# Patient Record
Sex: MALE | Race: WHITE | NOT HISPANIC OR LATINO | URBAN - METROPOLITAN AREA
[De-identification: names, ages, dates, MRNs, and addresses within clinical notes are randomized per-mention and may not be internally consistent; named-entity substitution may affect disease eponyms.]

---

## 2021-01-17 ENCOUNTER — EMERGENCY (EMERGENCY)
Facility: HOSPITAL | Age: 26
LOS: 1 days | Discharge: HOME | End: 2021-01-17
Payer: COMMERCIAL

## 2021-01-17 VITALS
HEIGHT: 72 IN | RESPIRATION RATE: 18 BRPM | SYSTOLIC BLOOD PRESSURE: 119 MMHG | HEART RATE: 60 BPM | TEMPERATURE: 98 F | OXYGEN SATURATION: 97 % | WEIGHT: 184.97 LBS | DIASTOLIC BLOOD PRESSURE: 71 MMHG

## 2021-01-17 LAB — SARS-COV-2 RNA SPEC QL NAA+PROBE: SIGNIFICANT CHANGE UP

## 2021-01-17 PROCEDURE — 99283 EMERGENCY DEPT VISIT LOW MDM: CPT

## 2021-01-17 PROCEDURE — U0003: CPT

## 2021-01-17 PROCEDURE — U0005: CPT

## 2021-01-20 NOTE — ED PROVIDER NOTE - PATIENT PORTAL LINK FT
You can access the FollowMyHealth Patient Portal offered by [facility] by registering at the following website: [email or web address]. By joining [employer]’s FollowMyHealth portal, you will also be able to view your health information using other applications (apps) compatible with our system.

## 2021-08-23 ENCOUNTER — EMERGENCY (EMERGENCY)
Facility: HOSPITAL | Age: 26
LOS: 1 days | Discharge: HOME | End: 2021-08-23
Attending: STUDENT IN AN ORGANIZED HEALTH CARE EDUCATION/TRAINING PROGRAM
Payer: COMMERCIAL

## 2021-08-23 VITALS
TEMPERATURE: 98 F | HEIGHT: 72 IN | OXYGEN SATURATION: 96 % | SYSTOLIC BLOOD PRESSURE: 148 MMHG | HEART RATE: 84 BPM | DIASTOLIC BLOOD PRESSURE: 81 MMHG | WEIGHT: 164.91 LBS | RESPIRATION RATE: 18 BRPM

## 2021-08-23 LAB
RAPID RVP RESULT: SIGNIFICANT CHANGE UP
SARS-COV-2 RNA SPEC QL NAA+PROBE: SIGNIFICANT CHANGE UP

## 2021-08-23 PROCEDURE — 99283 EMERGENCY DEPT VISIT LOW MDM: CPT | Mod: 25

## 2021-08-23 PROCEDURE — 0225U NFCT DS DNA&RNA 21 SARSCOV2: CPT

## 2021-08-23 PROCEDURE — 99284 EMERGENCY DEPT VISIT MOD MDM: CPT

## 2021-08-23 PROCEDURE — 93010 ELECTROCARDIOGRAM REPORT: CPT

## 2021-08-23 PROCEDURE — 93005 ELECTROCARDIOGRAM TRACING: CPT

## 2021-08-23 NOTE — ED PROVIDER NOTE - PATIENT PORTAL LINK FT
You can access the FollowMyHealth Patient Portal offered by [facility] by registering at the following website: [email or web address]. By joining [employer]’s FollowMyHealth portal, you will also be able to view your health information using other applications (apps) compatible with our system.

## 2021-08-23 NOTE — ED PROVIDER NOTE - NS ED ROS FT
+ fever no chills, No photophobia/eye pain/changes in vision, No ear pain/sore throat/dysphagia, No chest pain/palpitations, no SOB/cough/wheeze/stridor, No abdominal pain, No N/V +diarrhea, no dysuria/frequency/discharge, No neck/back pain, no rash, no changes in neurological status/function.

## 2021-08-23 NOTE — ED PROVIDER NOTE - CLINICAL SUMMARY MEDICAL DECISION MAKING FREE TEXT BOX
RVP sent. Pt likely with viral syndrome.  lungs clear.  Pt reassured and instructed to isolate until he knows his results. counseled on the r/b/a of vaccination.

## 2021-08-23 NOTE — ED PROVIDER NOTE - OBJECTIVE STATEMENT
Pt is a 25 yo M co headache, fever, diarrhea, and chest tightness. PT states that symptoms started a few days ago and measured a temp of 102 yesterday which responded to tylenol and a temp of 99.9 this morning.  PT states that the chest tightness was worse this morning. no n/v. no cough. no other complaints.
LILIYA Hunt

## 2021-08-23 NOTE — ED ADULT TRIAGE NOTE - CHIEF COMPLAINT QUOTE
Pt requesting covid, pt c/o headache and chest pain since Friday, pt unvaccinated, denies sick contacts

## 2021-08-24 ENCOUNTER — EMERGENCY (EMERGENCY)
Facility: HOSPITAL | Age: 26
LOS: 1 days | Discharge: HOME | End: 2021-08-24
Attending: EMERGENCY MEDICINE
Payer: COMMERCIAL

## 2021-08-24 VITALS
WEIGHT: 165.79 LBS | TEMPERATURE: 99 F | RESPIRATION RATE: 20 BRPM | DIASTOLIC BLOOD PRESSURE: 71 MMHG | OXYGEN SATURATION: 96 % | SYSTOLIC BLOOD PRESSURE: 108 MMHG | HEART RATE: 101 BPM | HEIGHT: 72 IN

## 2021-08-24 VITALS — DIASTOLIC BLOOD PRESSURE: 87 MMHG | TEMPERATURE: 99 F | HEART RATE: 81 BPM | SYSTOLIC BLOOD PRESSURE: 117 MMHG

## 2021-08-24 LAB
ALBUMIN SERPL ELPH-MCNC: 4.4 G/DL — SIGNIFICANT CHANGE UP (ref 3.3–5.2)
ALP SERPL-CCNC: 41 U/L — SIGNIFICANT CHANGE UP (ref 40–120)
ALT FLD-CCNC: 19 U/L — SIGNIFICANT CHANGE UP
ANION GAP SERPL CALC-SCNC: 13 MMOL/L — SIGNIFICANT CHANGE UP (ref 5–17)
AST SERPL-CCNC: 16 U/L — SIGNIFICANT CHANGE UP
BASOPHILS # BLD AUTO: 0 K/UL — SIGNIFICANT CHANGE UP (ref 0–0.2)
BASOPHILS NFR BLD AUTO: 0 % — SIGNIFICANT CHANGE UP (ref 0–2)
BILIRUB SERPL-MCNC: 0.4 MG/DL — SIGNIFICANT CHANGE UP (ref 0.4–2)
BUN SERPL-MCNC: 15.1 MG/DL — SIGNIFICANT CHANGE UP (ref 8–20)
CALCIUM SERPL-MCNC: 9.1 MG/DL — SIGNIFICANT CHANGE UP (ref 8.6–10.2)
CHLORIDE SERPL-SCNC: 99 MMOL/L — SIGNIFICANT CHANGE UP (ref 98–107)
CO2 SERPL-SCNC: 24 MMOL/L — SIGNIFICANT CHANGE UP (ref 22–29)
CREAT SERPL-MCNC: 0.82 MG/DL — SIGNIFICANT CHANGE UP (ref 0.5–1.3)
EOSINOPHIL # BLD AUTO: 0.11 K/UL — SIGNIFICANT CHANGE UP (ref 0–0.5)
EOSINOPHIL NFR BLD AUTO: 1.7 % — SIGNIFICANT CHANGE UP (ref 0–6)
GLUCOSE SERPL-MCNC: 111 MG/DL — HIGH (ref 70–99)
HCT VFR BLD CALC: 48.8 % — SIGNIFICANT CHANGE UP (ref 39–50)
HGB BLD-MCNC: 16.6 G/DL — SIGNIFICANT CHANGE UP (ref 13–17)
LIDOCAIN IGE QN: 11 U/L — LOW (ref 22–51)
LYMPHOCYTES # BLD AUTO: 0.4 K/UL — LOW (ref 1–3.3)
LYMPHOCYTES # BLD AUTO: 6.1 % — LOW (ref 13–44)
MCHC RBC-ENTMCNC: 30.4 PG — SIGNIFICANT CHANGE UP (ref 27–34)
MCHC RBC-ENTMCNC: 34 GM/DL — SIGNIFICANT CHANGE UP (ref 32–36)
MCV RBC AUTO: 89.4 FL — SIGNIFICANT CHANGE UP (ref 80–100)
MONOCYTES # BLD AUTO: 0.23 K/UL — SIGNIFICANT CHANGE UP (ref 0–0.9)
MONOCYTES NFR BLD AUTO: 3.5 % — SIGNIFICANT CHANGE UP (ref 2–14)
NEUTROPHILS # BLD AUTO: 5.74 K/UL — SIGNIFICANT CHANGE UP (ref 1.8–7.4)
NEUTROPHILS NFR BLD AUTO: 82.5 % — HIGH (ref 43–77)
NEUTS BAND # BLD: 4.4 % — SIGNIFICANT CHANGE UP (ref 0–8)
PLAT MORPH BLD: NORMAL — SIGNIFICANT CHANGE UP
PLATELET # BLD AUTO: 148 K/UL — LOW (ref 150–400)
POTASSIUM SERPL-MCNC: 4.2 MMOL/L — SIGNIFICANT CHANGE UP (ref 3.5–5.3)
POTASSIUM SERPL-SCNC: 4.2 MMOL/L — SIGNIFICANT CHANGE UP (ref 3.5–5.3)
PROT SERPL-MCNC: 7.5 G/DL — SIGNIFICANT CHANGE UP (ref 6.6–8.7)
RBC # BLD: 5.46 M/UL — SIGNIFICANT CHANGE UP (ref 4.2–5.8)
RBC # FLD: 11.5 % — SIGNIFICANT CHANGE UP (ref 10.3–14.5)
RBC BLD AUTO: NORMAL — SIGNIFICANT CHANGE UP
SODIUM SERPL-SCNC: 136 MMOL/L — SIGNIFICANT CHANGE UP (ref 135–145)
VARIANT LYMPHS # BLD: 1.8 % — SIGNIFICANT CHANGE UP (ref 0–6)
WBC # BLD: 6.61 K/UL — SIGNIFICANT CHANGE UP (ref 3.8–10.5)
WBC # FLD AUTO: 6.61 K/UL — SIGNIFICANT CHANGE UP (ref 3.8–10.5)

## 2021-08-24 PROCEDURE — 99285 EMERGENCY DEPT VISIT HI MDM: CPT

## 2021-08-24 PROCEDURE — 96374 THER/PROPH/DIAG INJ IV PUSH: CPT | Mod: XU

## 2021-08-24 PROCEDURE — 85025 COMPLETE CBC W/AUTO DIFF WBC: CPT

## 2021-08-24 PROCEDURE — 99284 EMERGENCY DEPT VISIT MOD MDM: CPT | Mod: 25

## 2021-08-24 PROCEDURE — G1004: CPT

## 2021-08-24 PROCEDURE — 83690 ASSAY OF LIPASE: CPT

## 2021-08-24 PROCEDURE — 36415 COLL VENOUS BLD VENIPUNCTURE: CPT

## 2021-08-24 PROCEDURE — 74177 CT ABD & PELVIS W/CONTRAST: CPT | Mod: 26,ME

## 2021-08-24 PROCEDURE — 74177 CT ABD & PELVIS W/CONTRAST: CPT | Mod: ME

## 2021-08-24 PROCEDURE — 80053 COMPREHEN METABOLIC PANEL: CPT

## 2021-08-24 PROCEDURE — 96375 TX/PRO/DX INJ NEW DRUG ADDON: CPT

## 2021-08-24 RX ORDER — ONDANSETRON 8 MG/1
4 TABLET, FILM COATED ORAL ONCE
Refills: 0 | Status: COMPLETED | OUTPATIENT
Start: 2021-08-24 | End: 2021-08-24

## 2021-08-24 RX ORDER — SODIUM CHLORIDE 9 MG/ML
1000 INJECTION INTRAMUSCULAR; INTRAVENOUS; SUBCUTANEOUS ONCE
Refills: 0 | Status: COMPLETED | OUTPATIENT
Start: 2021-08-24 | End: 2021-08-24

## 2021-08-24 RX ORDER — ACETAMINOPHEN 500 MG
1000 TABLET ORAL ONCE
Refills: 0 | Status: COMPLETED | OUTPATIENT
Start: 2021-08-24 | End: 2021-08-24

## 2021-08-24 RX ADMIN — ONDANSETRON 4 MILLIGRAM(S): 8 TABLET, FILM COATED ORAL at 09:57

## 2021-08-24 RX ADMIN — SODIUM CHLORIDE 1000 MILLILITER(S): 9 INJECTION INTRAMUSCULAR; INTRAVENOUS; SUBCUTANEOUS at 10:24

## 2021-08-24 RX ADMIN — Medication 400 MILLIGRAM(S): at 09:57

## 2021-08-24 RX ADMIN — Medication 1000 MILLIGRAM(S): at 09:24

## 2021-08-24 RX ADMIN — SODIUM CHLORIDE 1000 MILLILITER(S): 9 INJECTION INTRAMUSCULAR; INTRAVENOUS; SUBCUTANEOUS at 10:00

## 2021-08-24 NOTE — ED PROVIDER NOTE - NS ED MD DISPO DISCHARGE CCDA
Stop the Effexor completely and stick with the Pristiq. If this does not work then we will have to go back to the Effexor by itself. Can also call in a little prednisone to help with the wheezing for now.   Prednisone 10 mg #7    1 daily Patient/Caregiver provided printed discharge information.

## 2021-08-24 NOTE — ED PROVIDER NOTE - PATIENT PORTAL LINK FT
You can access the FollowMyHealth Patient Portal offered by [facility] by registering at the following website: [email or web address]. By joining [employer]’s FollowMyHealth portal, you will also be able to view your health information using other applications (apps) compatible with our system.

## 2021-08-24 NOTE — ED ADULT NURSE NOTE - OBJECTIVE STATEMENT
pt with all over abd pain  since Friday  has been taking pepto bismol, and tylenols  last being saturday   last po salad for dinner.    NO COVID VACCINE pt here with mother

## 2021-08-24 NOTE — ED ADULT TRIAGE NOTE - CHIEF COMPLAINT QUOTE
"I'm having abdominal pain in the center of my stomach with one episode of vomiting, pain started Friday". pt also co diarrhea and fevers.

## 2023-09-04 ENCOUNTER — EMERGENCY (EMERGENCY)
Facility: HOSPITAL | Age: 28
LOS: 1 days | Discharge: HOME | End: 2023-09-04
Attending: EMERGENCY MEDICINE
Payer: COMMERCIAL

## 2023-09-04 VITALS
TEMPERATURE: 98 F | RESPIRATION RATE: 18 BRPM | SYSTOLIC BLOOD PRESSURE: 125 MMHG | WEIGHT: 160.06 LBS | OXYGEN SATURATION: 99 % | HEART RATE: 84 BPM | DIASTOLIC BLOOD PRESSURE: 76 MMHG | HEIGHT: 71 IN

## 2023-09-04 PROCEDURE — 99283 EMERGENCY DEPT VISIT LOW MDM: CPT

## 2023-09-04 PROCEDURE — 12001 RPR S/N/AX/GEN/TRNK 2.5CM/<: CPT

## 2023-09-04 PROCEDURE — 99284 EMERGENCY DEPT VISIT MOD MDM: CPT | Mod: 25

## 2023-09-04 RX ORDER — CEPHALEXIN 500 MG
500 CAPSULE ORAL ONCE
Refills: 0 | Status: COMPLETED | OUTPATIENT
Start: 2023-09-04 | End: 2023-09-04

## 2023-09-04 RX ORDER — CEPHALEXIN 500 MG
1 CAPSULE ORAL
Qty: 28 | Refills: 0
Start: 2023-09-04 | End: 2023-09-10

## 2023-09-04 RX ADMIN — Medication 500 MILLIGRAM(S): at 21:57

## 2023-09-04 NOTE — ED PROVIDER NOTE - PHYSICAL EXAMINATION
Gen: Nontoxic, well appearing, in NAD.  Skin: Warm and dry as visualized. Approx. 2cm horizontal laceration over left iliac crest. No active bleeding.   Head: NC/AT.  Eyes: PERRLA. EOMI.  Neck: Supple, FROM. Trachea midline.   Resp: No distress.  Cardio: Well perfused.  Ext: No deformities. MAEx4. FROM.   Neuro: A&Ox3. Appears nonfocal.   Psych: Normal affect and mood.
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
## 2023-09-04 NOTE — ED PROVIDER NOTE - NS ED ATTENDING STATEMENT MOD
This was a shared visit with the JAI. I reviewed and verified the documentation and independently performed the documented:

## 2023-09-04 NOTE — ED PROVIDER NOTE - ATTENDING APP SHARED VISIT CONTRIBUTION OF CARE
I personally saw the patient with the JAI, and completed the key components of the history and physical exam. I then discussed the management plan with the JAI.
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
## 2023-09-04 NOTE — ED PROVIDER NOTE - NSFOLLOWUPINSTRUCTIONS_ED_ALL_ED_FT
- Prescription sent to pharmacy.  - Ibuprofen 600mg every 6 hours as needed for pain.  - Acetaminophen 650mg every 6 hours as needed for pain.   - Keep dressing clean, dry and in place for 24 hours. Then, may remove and cleanse using soap and water.  - Apply Bacitracin as needed.  - Suture removal 7-10 days. May present to primary care doctor, urgent care, or ED.  - Keep out of sun.  - Please follow up with your primary care physician in 48 hours for wound check.  - Please seek immediate medical attention for any new/worsening, signs/symptoms or concerns including but not limited to severe pain/swelling/surrounding redness, or drainage from wound.    Feel better!      Sutured Wound Care      Sutures are stitches that can be used to close wounds. Taking care of your wound properly can help to prevent pain and infection. It can also help your wound to heal more quickly. Follow instructions from your health care provider about how to care for your sutured wound.      Supplies needed:    •Soap and water.      •A clean bandage (dressing), if needed.      •Antibiotic ointment.      •A clean towel.        How to care for your sutured wound     •Keep the wound completely dry for the first 24 hours, or for as long as directed by your health care provider. After 24–48 hours, you may shower or bathe as directed by your health care provider. Do not soak or submerge the wound in water until the sutures have been removed.    •After the first 24 hours, clean the wound once a day, or as often as directed by your health care provider, using the following steps:  •Wash the wound with soap and water.      •Rinse the wound with water to remove all soap.      •Pat the wound dry with a clean towel. Do not rub the wound.        •After cleaning the wound, apply a thin layer of antibiotic ointment as directed by your health care provider. This will prevent infection and keep the dressing from sticking to the wound.    •Follow instructions from your health care provider about how to change your dressing:  •Wash your hands with soap and water. If soap and water are not available, use hand .      •Change your dressing at least once a day, or as often as told by your health care provider. If your dressing gets wet or dirty, change it.      •Leave sutures and other skin closures, such as adhesive tape or skin glue, in place. These skin closures may need to stay in place for 2 weeks or longer. If adhesive strip edges start to loosen and curl up, you may trim the loose edges. Do not remove adhesive strips completely unless your health care provider tells you to do that.      •Check your wound every day for signs of infection. Watch for:  •Redness, swelling, or pain.      •Fluid or blood.      •Warmth.      •Pus or a bad smell.        •Have the sutures removed as directed by your health care provider.        Follow these instructions at home:    Medicines     •Take or apply over-the-counter and prescription medicines only as told by your health care provider.      •If you were prescribed an antibiotic medicine or ointment, take or apply it as told by your health care provider. Do not stop using the antibiotic even if your condition improves.      General instructions     •To help reduce scarring after your wound heals, cover your wound with clothing or apply sunscreen of at least 30 SPF whenever you are outside.      • Do not scratch or pick at your wound.      •Avoid stretching your wound.      •Raise (elevate) the injured area above the level of your heart while you are sitting or lying down, if possible.      •Drink enough fluids to keep your urine clear or pale yellow.      •Keep all follow-up visits as told by your health care provider. This is important.        Contact a health care provider if:    •You received a tetanus shot and you have swelling, severe pain, redness, or bleeding at the injection site.      •Your wound breaks open.      •You have redness, swelling, or pain around your wound.      •You have fluid or blood coming from your wound.      •Your wound feels warm to the touch.      •You have a fever.      •You notice something coming out of your wound, such as wood or glass.      •You have pain that does not get better with medicine.      •The skin near your wound changes color.      •You need to change your dressing very frequently due to a lot of fluid, blood, or pus draining from the wound.      •You develop a new rash.      •You develop numbness around the wound.        Get help right away if:    •You develop severe swelling around your wound.      •You have pus or a bad smell coming from your wound.      •Your pain suddenly gets worse and is severe.      •You develop painful lumps near your wound or anywhere on your body.      •You have a red streak going away from your wound.    •The wound is on your hand or foot and:  •You cannot properly move a finger or toe.      •Your fingers or toes look pale or bluish.      •You have numbness that is spreading down your hand, foot, fingers, or toes.          Summary    •Sutures are stitches that can be used to close wounds.      •Taking care of your wound properly can help to prevent pain and infection.      •Keep the wound completely dry for the first 24 hours, or for as long as directed by your health care provider. After 24–48 hours, you may shower or bathe as directed by your health care provider.      This information is not intended to replace advice given to you by your health care provider. Make sure you discuss any questions you have with your health care provider.

## 2023-09-04 NOTE — ED PROVIDER NOTE - PATIENT PORTAL LINK FT
You can access the FollowMyHealth Patient Portal offered by [facility] by registering at the following website: [email or web address]. By joining [employer]’s FollowMyHealth portal, you will also be able to view your health information using other applications (apps) compatible with our system.

## 2023-09-05 PROBLEM — Z78.9 OTHER SPECIFIED HEALTH STATUS: Chronic | Status: ACTIVE | Noted: 2021-08-24

## 2023-09-10 ENCOUNTER — EMERGENCY (EMERGENCY)
Facility: HOSPITAL | Age: 28
LOS: 1 days | Discharge: HOME | End: 2023-09-10
Attending: EMERGENCY MEDICINE
Payer: COMMERCIAL

## 2023-09-10 VITALS
SYSTOLIC BLOOD PRESSURE: 130 MMHG | TEMPERATURE: 98 F | OXYGEN SATURATION: 97 % | DIASTOLIC BLOOD PRESSURE: 87 MMHG | WEIGHT: 160.06 LBS | HEART RATE: 97 BPM | RESPIRATION RATE: 18 BRPM | HEIGHT: 71 IN

## 2023-09-10 PROCEDURE — 99283 EMERGENCY DEPT VISIT LOW MDM: CPT | Mod: 25

## 2023-09-10 PROCEDURE — 12011 RPR F/E/E/N/L/M 2.5 CM/<: CPT

## 2023-09-10 PROCEDURE — 99282 EMERGENCY DEPT VISIT SF MDM: CPT

## 2023-09-10 NOTE — ED PROVIDER NOTE - PATIENT PORTAL LINK FT
You can access the FollowMyHealth Patient Portal offered by [facility] by registering at the following website: [email or web address]. By joining [employer]’s FollowMyHealth portal, you will also be able to view your health information using other applications (apps) compatible with our system.
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
## 2023-09-10 NOTE — ED PROVIDER NOTE - PHYSICAL EXAMINATION
General: Awake, alert, lying in bed in NAD  HEENT: 2cm vertical laceration to lower left lateral lip at the vermilion border. No obvious injury to mandible or teeth. No scleral icterus or conjunctival injection. EOMI. Moist mucous membranes. Oropharynx clear. PERRL  Neck:. Soft and supple.  Cardiac: RRR, Peripheral pulses 2+ and symmetric.  Resp: Lungs CTAB. No accessory muscle use  Abd: Soft, non-tender, non-distended. No guarding, rebound, or rigidity.  Back: Spine midline and non-tender.   Skin: lip laceration  Neuro: AO x 4. Moves all extremities symmetrically. Motor strength and sensation grossly intact.  Psych: Appropriate mood and affect General: Awake, alert, lying in bed in NAD  HEENT: 2cm vertical laceration to lower left lateral lip at the vermilion border. 3cm laceration to the interior aspect of lip in same area.  No obvious injury to mandible or teeth. No scleral icterus or conjunctival injection. EOMI. Moist mucous membranes. Oropharynx clear. PERRL  Neck:. Soft and supple.  Cardiac: RRR, Peripheral pulses 2+ and symmetric.  Resp: Lungs CTAB. No accessory muscle use  Abd: Soft, non-tender, non-distended. No guarding, rebound, or rigidity.  Back: Spine midline and non-tender.   Skin: lip laceration  Neuro: AO x 4. Moves all extremities symmetrically. Motor strength and sensation grossly intact.  Psych: Appropriate mood and affect General: Awake, alert, lying in bed in NAD  HEENT: 2cm vertical laceration to lower left lateral lip at the vermilion border. 3cm laceration to the interior aspect of lip in same area. Laceration with mild tenderness. no purulent drainage or surrounding erythema. No obvious injury to mandible or teeth. No scleral icterus or conjunctival injection. EOMI. Moist mucous membranes. Oropharynx clear. PERRL  Neck:. Soft and supple.  Cardiac: RRR, Peripheral pulses 2+ and symmetric.  Resp: Lungs CTAB. No accessory muscle use  Abd: Soft, non-tender, non-distended. No guarding, rebound, or rigidity.  Back: Spine midline and non-tender.   Skin: lip laceration  Neuro: AO x 4. Moves all extremities symmetrically. Motor strength and sensation grossly intact.  Psych: Appropriate mood and affect

## 2023-09-10 NOTE — ED PROVIDER NOTE - NSFOLLOWUPINSTRUCTIONS_ED_ALL_ED_FT
Keep the wound clean and dry. Stitches will need to come out in 7 days.    You are advised to please follow up with your primary care doctor within the next 24 hours and return to the Emergency Department for worsening symptoms or any other concerns.  Your doctor may call [phone] to follow up on the specific results of the tests performed today in the emergency department.     A facial laceration is a cut (laceration) on the face. It is caused by any injury that cuts or tears the skin or tissues on the face. Facial lacerations can bleed and be painful.    You may need medical attention to stop the bleeding, help the wound heal, lower your risk of infection, and prevent scarring. Lacerations usually heal quickly after treatment.    What are the causes?  This condition may be caused by:  A motor vehicle crash.  A sports injury.  A violent attack.  A fall.  What are the signs or symptoms?  Common symptoms of this condition include:  An obvious cut on the face.  Bleeding.  Pain.  Swelling.  Bruising.  A change in the appearance of the face.  How is this diagnosed?  Your health care provider can diagnose a facial laceration by doing a physical exam and asking how the injury happened. Your health care provider may also check for areas of bleeding, tissue damage, nerve injury, and objects (foreign bodies) in your wound.    How is this treated?  Treatment for a facial laceration depends on how severe and deep the wound is. It also depends on the risk for infection. First, your health care provider will clean the wound to prevent infection. Then, your health care provider will decide whether to close the wound. This depends on how deep the laceration is and how long ago your injury happened. If there is an increased risk of infection, the wound will not be closed.  If your wound needs to be closed:  Your health care provider will use stitches (sutures), skin glue (skin adhesive), or skin adhesive strips to repair the laceration.  Your health care provider may numb the area around your wound by injecting a numbing medicine in and around your laceration before doing the sutures.  Torn skin edges or dead skin may be removed.  If sutures are used, the laceration may be closed in layers. Absorbable sutures will be used for deep tissues and muscle. Removable sutures will be used to close the skin.  You may be given:  Pain medicine.  A tetanus shot.  Oral antibiotic medicines.  Antibiotic ointment.  Follow these instructions at home:  Wound care    Follow instructions from your health care provider about how to take care of your wound. Make sure you:  Wash your hands with soap and water for at least 20 seconds before and after you change your bandage (dressing). If soap and water are not available, use hand .  Change your dressing as told by your health care provider.  Leave sutures, skin adhesive, or adhesive strips in place. These skin closures may need to stay in place for 2 weeks or longer. If adhesive strip edges start to loosen and curl up, you may trim the loose edges. Do not remove adhesive strips completely unless your health care provider tells you to do that.  These instructions will vary depending on how the wound was closed.    For sutures:      Keep the wound clean and dry.  If you were given a dressing, change it at least once a day, or as told by your health care provider. Also change the dressing if it gets wet or dirty.  Wash the wound with soap and water two times a day, or as told by your health care provider. Rinse off the soap with water. Pat the wound dry with a clean towel.  After cleaning, apply a thin layer of antibiotic ointment as told by your health care provider. This helps prevent infection and keeps the dressing from sticking to the wound.  You may shower as usual after the first 24 hours. Do not soak the wound until the sutures are removed.  Return to have your sutures removed as told by your health care provider.  Do not wear makeup in the area of the wound until your health care provider has approved.  For skin adhesive:      You may briefly wet your wound in the shower or bath.  Do not soak or scrub the wound.  Do not swim.  Do not sweat heavily until the skin adhesive has fallen off on its own.  After showering or bathing, gently pat the wound dry with a clean towel.  Do not apply liquid medicine, cream medicine, ointment, or makeup to your wound while the skin adhesive is in place. This may loosen the film before your wound is healed.  If you have a dressing over your wound, be careful not to apply tape directly over the skin adhesive. This may pull off the adhesive before the wound is healed.  Do not spend a long time in the sun or use a tanning lamp while the skin adhesive is in place.  The skin adhesive will usually remain in place for 5–10 days and then naturally fall off the skin. Do not pick at the adhesive film.  For skin adhesive strips:      Keep the wound clean and dry.  Do not let the skin adhesive strips get wet.  Bathe carefully to keep the wound and adhesive strips dry. If the wound gets wet, pat it dry with a clean towel right away.  Skin adhesive strips fall off on their own over time. You may trim the strips as the wound heals. Do not remove skin adhesive strips that are still stuck to the wound.  General instructions    Check your wound area every day for signs of infection. Check for:  More redness, swelling, or pain.  Fluid or blood.  Warmth.  Pus or a bad smell.  Take over-the-counter and prescription medicines only as told by your health care provider.  If you were prescribed an antibiotic medicine, take it or apply it as told by your health care provider. Do not stop using the antibiotic even if you start to feel better.  After the laceration has healed:  Know that it can take a year or two for redness or scarring to fade.  Apply sunscreen to the skin of your healed wound to minimize scarring. Ultraviolet (UV) rays can darken scar tissue.  Contact a health care provider if:  You have a fever. A fever is a body temperature that is 100.4°F (38°C) or higher.  You have more redness, swelling, or pain around your wound.  You have fluid or blood coming from your wound.  Your wound feels warm to the touch.  You have pus or a bad smell coming from your wound.  Get help right away if:  You have a red streak going away from your wound.  Summary  You may need treatment for a facial laceration to prevent infection, stop bleeding, help healing, and prevent scarring.  A deep laceration may be closed with stitches (sutures).  Follow your health care provider's wound care instructions carefully.  This information is not intended to replace advice given to you by your health care provider. Make sure you discuss any questions you have with your health care provider.
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
## 2023-09-10 NOTE — ED PROVIDER NOTE - CARE PLAN
Lukasz Anderson)  Medicine  Pulmonary Medicine  [address] [place] [address], [place] [address]  Phone: [phone]  Fax: [phone]  Follow Up Time:    1 Principal Discharge DX:	Lip laceration
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
## 2023-09-13 ENCOUNTER — EMERGENCY (EMERGENCY)
Facility: HOSPITAL | Age: 28
LOS: 1 days | Discharge: LEFT BEFORE DISCHARGE | End: 2023-09-13
Payer: COMMERCIAL

## 2023-09-13 PROCEDURE — L9992: CPT

## 2023-09-14 ENCOUNTER — EMERGENCY (EMERGENCY)
Facility: HOSPITAL | Age: 28
LOS: 1 days | Discharge: HOME | End: 2023-09-14
Attending: EMERGENCY MEDICINE
Payer: COMMERCIAL

## 2023-09-14 VITALS
RESPIRATION RATE: 18 BRPM | HEART RATE: 61 BPM | TEMPERATURE: 98 F | HEIGHT: 71 IN | OXYGEN SATURATION: 99 % | SYSTOLIC BLOOD PRESSURE: 135 MMHG | DIASTOLIC BLOOD PRESSURE: 84 MMHG | WEIGHT: 158.07 LBS

## 2023-09-14 PROCEDURE — G0463: CPT

## 2023-09-14 PROCEDURE — L9995: CPT

## 2023-09-14 NOTE — ED PROVIDER NOTE - ATTENDING APP SHARED VISIT CONTRIBUTION OF CARE
Lore: I performed a face to face bedside interview with patient regarding history of present illness, review of symptoms and past medical history. I completed an independent physical exam.  I have discussed patient's plan of care with advanced care provider.   I agree with note as stated above including HISTORY OF PRESENT ILLNESS, HIV, PAST MEDICAL/SURGICAL/FAMILY/SOCIAL HISTORY, ALLERGIES AND HOME MEDICATIONS, REVIEW OF SYSTEMS, PHYSICAL EXAM, MEDICAL DECISION MAKING and any PROGRESS NOTES during the time I functioned as the attending physician for this patient  unless otherwise noted. My brief assessment is as follows: here for suture removal from hip placed on 9/4, also had lip sutures placed on 9/10. healing, no discharge or other findings. sutures removed by SHYAM Man. wound care instructions. return precautions.

## 2023-09-14 NOTE — ED PROVIDER NOTE - OBJECTIVE STATEMENT
28 yr old M presented to ED for suture removal. Pt denies any redness or pain to his injury site. Pt also denies any fever, chills or issues with the sutures. Pt denies any other issues at this time.

## 2023-09-14 NOTE — ED PROVIDER NOTE - PATIENT PORTAL LINK FT
You can access the FollowMyHealth Patient Portal offered by [facility] by registering at the following website: [email or web address]. By joining [employer]’s FollowMyHealth portal, you will also be able to view your health information using other applications (apps) compatible with our system.

## 2023-09-14 NOTE — ED ADULT TRIAGE NOTE - ESI TRIAGE ACUITY LEVEL, MLM
Subjective:         Patient ID: Jean Pierre Paulson is a 39 y.o. male.    Chief Complaint: Foot Pain      Pain  This is a chronic problem. The current episode started more than 1 year ago. The problem occurs daily. The problem has been waxing and waning. Associated symptoms include arthralgias and neck pain. Pertinent negatives include no abdominal pain, change in bowel habit, chest pain, chills, coughing, diaphoresis, fatigue, fever, rash, sore throat, vertigo or vomiting.   Review of Systems   Constitutional:  Negative for activity change, appetite change, chills, diaphoresis, fatigue, fever and unexpected weight change.   HENT:  Negative for drooling, ear discharge, ear pain, facial swelling, nosebleeds, sore throat, trouble swallowing, voice change and goiter.    Eyes:  Negative for photophobia, pain, discharge, redness and visual disturbance.   Respiratory:  Negative for apnea, cough, choking, chest tightness, shortness of breath, wheezing and stridor.    Cardiovascular:  Negative for chest pain, palpitations and leg swelling.   Gastrointestinal:  Negative for abdominal distention, abdominal pain, change in bowel habit, diarrhea, rectal pain, vomiting, fecal incontinence and change in bowel habit.   Endocrine: Negative for cold intolerance, heat intolerance, polydipsia, polyphagia and polyuria.   Genitourinary:  Negative for bladder incontinence, dysuria, flank pain and frequency.   Musculoskeletal:  Positive for arthralgias, back pain, leg pain and neck pain. Negative for neck stiffness.   Integumentary:  Negative for color change, pallor and rash.   Neurological:  Negative for dizziness, vertigo, seizures, syncope, facial asymmetry, speech difficulty, light-headedness, coordination difficulties, memory loss and coordination difficulties.   Hematological:  Negative for adenopathy. Does not bruise/bleed easily.   Psychiatric/Behavioral:  Negative for agitation, behavioral problems, confusion, decreased concentration,  "dysphoric mood, hallucinations, self-injury and suicidal ideas. The patient is not nervous/anxious and is not hyperactive.          Past Medical History:   Diagnosis Date    Anemia     Chronic osteomyelitis     Diabetes mellitus with neuropathy     Diabetes mellitus, type 2     DM2 (diabetes mellitus, type 2)     HTN (hypertension)     Hypertension     Neuropathy     Obesity     Osteomyelitis 10/2020    Hx of R foot, Tx with IV Abx    Peripheral vascular disease      Past Surgical History:   Procedure Laterality Date    APPLICATION OF SPLIT-THICKNESS SKIN GRAFT (STSG) TO LOWER EXTREMITY Right 3/2/2022    Procedure: APPLICATION, GRAFT, SKIN, SPLIT-THICKNESS, TO LOWER EXTREMITY;  Surgeon: Greg Ramírez MD;  Location: [facility];  Service: General;  Laterality: Right;    CHOLECYSTECTOMY      DEBRIDEMENT OF FOOT Right 10/2020    FOOT SURGERY      Right diabetic foot ulcer Elliott's Grade 3      Bone exposure to R heel, Hx of IV Abx, cultures show multiple bacterial growth, Hx of Osteomyelitis, HBOT     Social History     Socioeconomic History    Marital status: Single    Number of children: 1   Tobacco Use    Smoking status: Former     Packs/day: 0.25     Types: Cigarettes     Start date:      Quit date: 2022     Years since quittin.6    Smokeless tobacco: Never   Substance and Sexual Activity    Alcohol use: Yes     Alcohol/week: 2.0 standard drinks     Types: 1 Glasses of wine, 1 Cans of beer per week     Comment: occasionally    Drug use: Yes     Types: Hydrocodone    Sexual activity: Yes     Partners: Female     Family History   Problem Relation Age of Onset    Hypertension Father     Diabetes Father     Hypertension Maternal Grandmother     Diabetes Maternal Grandmother      Review of patient's allergies indicates:   Allergen Reactions    Tomato Itching        Objective:  Vitals:    22 1020 22 1021   BP: (!) 161/99    Pulse: 89    Weight: (!) 150.6 kg (332 lb)    Height: 6' 1.2" (1.859 m)  "   PainSc:   4   4         Physical Exam  Vitals and nursing note reviewed. Exam conducted with a chaperone present.   Constitutional:       General: He is awake. He is not in acute distress.     Appearance: Normal appearance. He is not ill-appearing, toxic-appearing or diaphoretic.   HENT:      Head: Normocephalic and atraumatic.      Nose: Nose normal.      Mouth/Throat:      Mouth: Mucous membranes are moist.      Pharynx: Oropharynx is clear.   Eyes:      Conjunctiva/sclera: Conjunctivae normal.      Pupils: Pupils are equal, round, and reactive to light.   Cardiovascular:      Rate and Rhythm: Normal rate.   Pulmonary:      Effort: Pulmonary effort is normal. No respiratory distress.   Abdominal:      Palpations: Abdomen is soft.      Tenderness: There is no guarding.   Musculoskeletal:         General: Normal range of motion.      Cervical back: Normal range of motion and neck supple. No rigidity.      Lumbar back: Tenderness present.      Right foot: Tenderness present.      Left foot: Tenderness present.   Skin:     General: Skin is warm and dry.      Coloration: Skin is not jaundiced or pale.   Neurological:      General: No focal deficit present.      Mental Status: He is alert and oriented to person, place, and time. Mental status is at baseline.      Cranial Nerves: No cranial nerve deficit (II-XII).   Psychiatric:         Mood and Affect: Mood normal.         Behavior: Behavior normal. Behavior is cooperative.         Thought Content: Thought content normal.         NM Bone Scan 3 Phase Foot  Narrative: EXAMINATION:  NM BONE SCAN 3 PHASE FOOT    CLINICAL HISTORY:  Post-menopause state, Osteoporosis  Foot swelling, diabetic, osteomyelitis suspected, xray done;suspect chronic osteomyelitis;    COMPARISON:  CT 12 October 2021    FINDINGS:  Patient was injected with 25.0 millicuries of technetium 99M MDP intravenously.  Blood flow, blood pool and delayed images were obtained.    Increased activity diffusely  in the right foot on the blood flow and blood pool imaging.  There is increased osseous uptake on the delayed imaging mostly in the right midfoot but also faintly seen in the right inferior calcaneus and portions of the left midfoot.  The right foot activity mostly correlates with area of severe degenerative changes and malalignment seen on the CT.  Impression: Increased activity diffusely in the right foot soft tissues in the blood flow and blood pool images.  There is increased activity in the midfoot correlating with area of degenerative changes and malalignment.  Faint activity in calcaneus more typical of reactive changes.  Findings do not appear typical for acute osteomyelitis.    Electronically signed by: Sheng Anderson  Date:    02/17/2022  Time:    12:38       Lab Requisition on 09/09/2022   Component Date Value Ref Range Status    Sodium 09/09/2022 142  136 - 145 mmol/L Final    Potassium 09/09/2022 4.5  3.5 - 5.1 mmol/L Final    Chloride 09/09/2022 110 (H)  98 - 107 mmol/L Final    CO2 09/09/2022 23  21 - 32 mmol/L Final    Anion Gap 09/09/2022 14  7 - 16 mmol/L Final    Glucose 09/09/2022 84  74 - 106 mg/dL Final    BUN 09/09/2022 14  7 - 18 mg/dL Final    Creatinine 09/09/2022 0.82  0.70 - 1.30 mg/dL Final    BUN/Creatinine Ratio 09/09/2022 17  6 - 20 Final    Calcium 09/09/2022 7.8 (L)  8.5 - 10.1 mg/dL Final    eGFR 09/09/2022 115  >=60 mL/min/1.73m² Final    CRP 09/09/2022 2.15 (H)  0.00 - 0.80 mg/dL Final   Lab Requisition on 09/02/2022   Component Date Value Ref Range Status    Sodium 09/02/2022 138  136 - 145 mmol/L Final    Potassium 09/02/2022 5.2 (H)  3.5 - 5.1 mmol/L Final    Chloride 09/02/2022 106  98 - 107 mmol/L Final    CO2 09/02/2022 28  21 - 32 mmol/L Final    Anion Gap 09/02/2022 9  7 - 16 mmol/L Final    Glucose 09/02/2022 109 (H)  74 - 106 mg/dL Final    BUN 09/02/2022 19 (H)  7 - 18 mg/dL Final    Creatinine 09/02/2022 1.15  0.70 - 1.30 mg/dL Final    BUN/Creatinine Ratio  09/02/2022 17  6 - 20 Final    Calcium 09/02/2022 8.4 (L)  8.5 - 10.1 mg/dL Final    eGFR 09/02/2022 83  >=60 mL/min/1.73m² Final    CRP 09/02/2022 3.73 (H)  0.00 - 0.80 mg/dL Final    ESR Westergren 09/02/2022 78 (H)  0 - 15 mm/Hr Final    WBC 09/02/2022 6.83  4.50 - 11.00 K/uL Final    RBC 09/02/2022 4.25 (L)  4.60 - 6.20 M/uL Final    Hemoglobin 09/02/2022 11.6 (L)  13.5 - 18.0 g/dL Final    Hematocrit 09/02/2022 35.0 (L)  40.0 - 54.0 % Final    MCV 09/02/2022 82.4  80.0 - 96.0 fL Final    MCH 09/02/2022 27.3  27.0 - 31.0 pg Final    MCHC 09/02/2022 33.1  32.0 - 36.0 g/dL Final    RDW 09/02/2022 14.1  11.5 - 14.5 % Final    Platelet Count 09/02/2022 224  150 - 400 K/uL Final    MPV 09/02/2022 10.8  9.4 - 12.4 fL Final    Neutrophils % 09/02/2022 58.2  53.0 - 65.0 % Final    Lymphocytes % 09/02/2022 28.0  27.0 - 41.0 % Final    Monocytes % 09/02/2022 9.1 (H)  2.0 - 6.0 % Final    Eosinophils % 09/02/2022 3.8  1.0 - 4.0 % Final    Basophils % 09/02/2022 0.6  0.0 - 1.0 % Final    Immature Granulocytes % 09/02/2022 0.3  0.0 - 0.4 % Final    nRBC, Auto 09/02/2022 0.0  <=0.0 % Final    Neutrophils, Abs 09/02/2022 3.98  1.80 - 7.70 K/uL Final    Lymphocytes, Absolute 09/02/2022 1.91  1.00 - 4.80 K/uL Final    Monocytes, Absolute 09/02/2022 0.62  0.00 - 0.80 K/uL Final    Eosinophils, Absolute 09/02/2022 0.26  0.00 - 0.50 K/uL Final    Basophils, Absolute 09/02/2022 0.04  0.00 - 0.20 K/uL Final    Immature Granulocytes, Absolute 09/02/2022 0.02  0.00 - 0.04 K/uL Final    nRBC, Absolute 09/02/2022 0.00  <=0.00 x10e3/uL Final    Diff Type 09/02/2022 Auto   Final   Lab Requisition on 09/01/2022   Component Date Value Ref Range Status    Sodium 09/01/2022 137  136 - 145 mmol/L Final    Potassium 09/01/2022 4.6  3.5 - 5.1 mmol/L Final    Chloride 09/01/2022 105  98 - 107 mmol/L Final    CO2 09/01/2022 28  21 - 32 mmol/L Final    Anion Gap 09/01/2022 9  7 - 16 mmol/L Final    Glucose 09/01/2022 103  74 - 106 mg/dL Final    BUN  09/01/2022 17  7 - 18 mg/dL Final    Creatinine 09/01/2022 1.09  0.70 - 1.30 mg/dL Final    BUN/Creatinine Ratio 09/01/2022 16  6 - 20 Final    Calcium 09/01/2022 9.0  8.5 - 10.1 mg/dL Final    Total Protein 09/01/2022 8.1  6.4 - 8.2 g/dL Final    Albumin 09/01/2022 2.8 (L)  3.5 - 5.0 g/dL Final    Globulin 09/01/2022 5.3 (H)  2.0 - 4.0 g/dL Final    A/G Ratio 09/01/2022 0.5   Final    Bilirubin, Total 09/01/2022 0.4  >0.0 - 1.2 mg/dL Final    Alk Phos 09/01/2022 153 (H)  45 - 115 U/L Final    ALT 09/01/2022 51  16 - 61 U/L Final    AST 09/01/2022 28  15 - 37 U/L Final    eGFR 09/01/2022 89  >=60 mL/min/1.73m² Final    Triglycerides 09/01/2022 109  35 - 150 mg/dL Final    Cholesterol 09/01/2022 120  0 - 200 mg/dL Final    HDL Cholesterol 09/01/2022 45  40 - 60 mg/dL Final    Cholesterol/HDL Ratio (Risk Factor) 09/01/2022 2.7   Final    Non-HDL 09/01/2022 75  mg/dL Final    LDL Calculated 09/01/2022 53  mg/dL Final    LDL/HDL 09/01/2022 1.2   Final    VLDL 09/01/2022 22  mg/dL Final    Free T4 09/01/2022 0.94  0.76 - 1.46 ng/dL Final    TSH 09/01/2022 2.720  0.358 - 3.740 uIU/mL Final    Hemoglobin A1C 09/01/2022 6.8 (H)  4.5 - 6.6 % Final    Estimated Average Glucose 09/01/2022 140  mg/dL Final    WBC 09/01/2022 5.89  4.50 - 11.00 K/uL Final    RBC 09/01/2022 4.63  4.60 - 6.20 M/uL Final    Hemoglobin 09/01/2022 12.4 (L)  13.5 - 18.0 g/dL Final    Hematocrit 09/01/2022 38.7 (L)  40.0 - 54.0 % Final    MCV 09/01/2022 83.6  80.0 - 96.0 fL Final    MCH 09/01/2022 26.8 (L)  27.0 - 31.0 pg Final    MCHC 09/01/2022 32.0  32.0 - 36.0 g/dL Final    RDW 09/01/2022 13.9  11.5 - 14.5 % Final    Platelet Count 09/01/2022 268  150 - 400 K/uL Final    MPV 09/01/2022 10.7  9.4 - 12.4 fL Final    Neutrophils % 09/01/2022 50.0 (L)  53.0 - 65.0 % Final    Lymphocytes % 09/01/2022 36.3  27.0 - 41.0 % Final    Monocytes % 09/01/2022 9.0 (H)  2.0 - 6.0 % Final    Eosinophils % 09/01/2022 3.6  1.0 - 4.0 % Final    Basophils % 09/01/2022  0.8  0.0 - 1.0 % Final    Immature Granulocytes % 09/01/2022 0.3  0.0 - 0.4 % Final    nRBC, Auto 09/01/2022 0.0  <=0.0 % Final    Neutrophils, Abs 09/01/2022 2.94  1.80 - 7.70 K/uL Final    Lymphocytes, Absolute 09/01/2022 2.14  1.00 - 4.80 K/uL Final    Monocytes, Absolute 09/01/2022 0.53  0.00 - 0.80 K/uL Final    Eosinophils, Absolute 09/01/2022 0.21  0.00 - 0.50 K/uL Final    Basophils, Absolute 09/01/2022 0.05  0.00 - 0.20 K/uL Final    Immature Granulocytes, Absolute 09/01/2022 0.02  0.00 - 0.04 K/uL Final    nRBC, Absolute 09/01/2022 0.00  <=0.00 x10e3/uL Final    Diff Type 09/01/2022 Auto   Final   Lab Requisition on 08/26/2022   Component Date Value Ref Range Status    Sodium 08/26/2022 139  136 - 145 mmol/L Final    Potassium 08/26/2022 4.2  3.5 - 5.1 mmol/L Final    Chloride 08/26/2022 106  98 - 107 mmol/L Final    CO2 08/26/2022 28  21 - 32 mmol/L Final    Anion Gap 08/26/2022 9  7 - 16 mmol/L Final    Glucose 08/26/2022 82  74 - 106 mg/dL Final    BUN 08/26/2022 23 (H)  7 - 18 mg/dL Final    Creatinine 08/26/2022 0.97  0.70 - 1.30 mg/dL Final    BUN/Creatinine Ratio 08/26/2022 24 (H)  6 - 20 Final    Calcium 08/26/2022 8.6  8.5 - 10.1 mg/dL Final    eGFR 08/26/2022 102  >=60 mL/min/1.73m² Final    CRP 08/26/2022 1.06 (H)  0.00 - 0.80 mg/dL Final    ESR Westergren 08/26/2022 43 (H)  0 - 15 mm/Hr Final    WBC 08/26/2022 4.74  4.50 - 11.00 K/uL Final    RBC 08/26/2022 4.56 (L)  4.60 - 6.20 M/uL Final    Hemoglobin 08/26/2022 12.5 (L)  13.5 - 18.0 g/dL Final    Hematocrit 08/26/2022 38.4 (L)  40.0 - 54.0 % Final    MCV 08/26/2022 84.2  80.0 - 96.0 fL Final    MCH 08/26/2022 27.4  27.0 - 31.0 pg Final    MCHC 08/26/2022 32.6  32.0 - 36.0 g/dL Final    RDW 08/26/2022 13.8  11.5 - 14.5 % Final    Platelet Count 08/26/2022 253  150 - 400 K/uL Final    MPV 08/26/2022 10.3  9.4 - 12.4 fL Final    Neutrophils % 08/26/2022 38.7 (L)  53.0 - 65.0 % Final    Lymphocytes % 08/26/2022 46.0 (H)  27.0 - 41.0 % Final     Monocytes % 08/26/2022 8.4 (H)  2.0 - 6.0 % Final    Eosinophils % 08/26/2022 6.1 (H)  1.0 - 4.0 % Final    Basophils % 08/26/2022 0.8  0.0 - 1.0 % Final    Immature Granulocytes % 08/26/2022 0.0  0.0 - 0.4 % Final    nRBC, Auto 08/26/2022 0.0  <=0.0 % Final    Neutrophils, Abs 08/26/2022 1.83  1.80 - 7.70 K/uL Final    Lymphocytes, Absolute 08/26/2022 2.18  1.00 - 4.80 K/uL Final    Monocytes, Absolute 08/26/2022 0.40  0.00 - 0.80 K/uL Final    Eosinophils, Absolute 08/26/2022 0.29  0.00 - 0.50 K/uL Final    Basophils, Absolute 08/26/2022 0.04  0.00 - 0.20 K/uL Final    Immature Granulocytes, Absolute 08/26/2022 0.00  0.00 - 0.04 K/uL Final    nRBC, Absolute 08/26/2022 0.00  <=0.00 x10e3/uL Final    Diff Type 08/26/2022 Auto   Final   Lab Requisition on 08/19/2022   Component Date Value Ref Range Status    Sodium 08/19/2022 139  136 - 145 mmol/L Final    Potassium 08/19/2022 4.3  3.5 - 5.1 mmol/L Final    Chloride 08/19/2022 107  98 - 107 mmol/L Final    CO2 08/19/2022 28  21 - 32 mmol/L Final    Anion Gap 08/19/2022 8  7 - 16 mmol/L Final    Glucose 08/19/2022 92  74 - 106 mg/dL Final    BUN 08/19/2022 20 (H)  7 - 18 mg/dL Final    Creatinine 08/19/2022 1.02  0.70 - 1.30 mg/dL Final    BUN/Creatinine Ratio 08/19/2022 20  6 - 20 Final    Calcium 08/19/2022 8.6  8.5 - 10.1 mg/dL Final    eGFR 08/19/2022 96  >=60 mL/min/1.73m² Final    CRP 08/19/2022 0.43  0.00 - 0.80 mg/dL Final    ESR Franciscoergren 08/19/2022 65 (H)  0 - 15 mm/Hr Final    WBC 08/19/2022 5.59  4.50 - 11.00 K/uL Final    RBC 08/19/2022 4.71  4.60 - 6.20 M/uL Final    Hemoglobin 08/19/2022 12.7 (L)  13.5 - 18.0 g/dL Final    Hematocrit 08/19/2022 38.5 (L)  40.0 - 54.0 % Final    MCV 08/19/2022 81.7  80.0 - 96.0 fL Final    MCH 08/19/2022 27.0  27.0 - 31.0 pg Final    MCHC 08/19/2022 33.0  32.0 - 36.0 g/dL Final    RDW 08/19/2022 13.3  11.5 - 14.5 % Final    Platelet Count 08/19/2022 289  150 - 400 K/uL Final    MPV 08/19/2022 9.8  9.4 - 12.4 fL Final     Neutrophils % 08/19/2022 42.2 (L)  53.0 - 65.0 % Final    Lymphocytes % 08/19/2022 44.0 (H)  27.0 - 41.0 % Final    Monocytes % 08/19/2022 6.6 (H)  2.0 - 6.0 % Final    Eosinophils % 08/19/2022 5.9 (H)  1.0 - 4.0 % Final    Basophils % 08/19/2022 1.1 (H)  0.0 - 1.0 % Final    Immature Granulocytes % 08/19/2022 0.2  0.0 - 0.4 % Final    nRBC, Auto 08/19/2022 0.0  <=0.0 % Final    Neutrophils, Abs 08/19/2022 2.36  1.80 - 7.70 K/uL Final    Lymphocytes, Absolute 08/19/2022 2.46  1.00 - 4.80 K/uL Final    Monocytes, Absolute 08/19/2022 0.37  0.00 - 0.80 K/uL Final    Eosinophils, Absolute 08/19/2022 0.33  0.00 - 0.50 K/uL Final    Basophils, Absolute 08/19/2022 0.06  0.00 - 0.20 K/uL Final    Immature Granulocytes, Absolute 08/19/2022 0.01  0.00 - 0.04 K/uL Final    nRBC, Absolute 08/19/2022 0.00  <=0.00 x10e3/uL Final    Diff Type 08/19/2022 Auto   Final   Lab Requisition on 08/18/2022   Component Date Value Ref Range Status    Sodium 08/18/2022 143  136 - 145 mmol/L Final    Potassium 08/18/2022 3.4 (L)  3.5 - 5.1 mmol/L Final    Chloride 08/18/2022 113 (H)  98 - 107 mmol/L Final    CO2 08/18/2022 26  21 - 32 mmol/L Final    Anion Gap 08/18/2022 7  7 - 16 mmol/L Final    Glucose 08/18/2022 81  74 - 106 mg/dL Final    BUN 08/18/2022 24 (H)  7 - 18 mg/dL Final    Creatinine 08/18/2022 0.86  0.70 - 1.30 mg/dL Final    BUN/Creatinine Ratio 08/18/2022 28 (H)  6 - 20 Final    Calcium 08/18/2022 7.6 (L)  8.5 - 10.1 mg/dL Final    eGFR 08/18/2022 113  >=60 mL/min/1.73m² Final    CRP 08/18/2022 0.48  0.00 - 0.80 mg/dL Final    ESR Westergren 08/18/2022 1  0 - 15 mm/Hr Final    WBC 08/18/2022 4.55  4.50 - 11.00 K/uL Final    RBC 08/18/2022 8.56 (H)  4.60 - 6.20 M/uL Final    Hemoglobin 08/18/2022 23.1 (HH)  13.5 - 18.0 g/dL Final    Hematocrit 08/18/2022 68.7 (HH)  40.0 - 54.0 % Final    MCV 08/18/2022 80.3  80.0 - 96.0 fL Final    MCH 08/18/2022 27.0  27.0 - 31.0 pg Final    MCHC 08/18/2022 33.6  32.0 - 36.0 g/dL Final    RDW  08/18/2022 16.8 (H)  11.5 - 14.5 % Final    Platelet Count 08/18/2022 76 (L)  150 - 400 K/uL Final    MPV 08/18/2022 9.9  9.4 - 12.4 fL Final    Neutrophils % 08/18/2022 62.8  53.0 - 65.0 % Final    Lymphocytes % 08/18/2022 23.3 (L)  27.0 - 41.0 % Final    Monocytes % 08/18/2022 5.1  2.0 - 6.0 % Final    Eosinophils % 08/18/2022 7.5 (H)  1.0 - 4.0 % Final    Basophils % 08/18/2022 1.1 (H)  0.0 - 1.0 % Final    Immature Granulocytes % 08/18/2022 0.2  0.0 - 0.4 % Final    nRBC, Auto 08/18/2022 0.0  <=0.0 % Final    Neutrophils, Abs 08/18/2022 2.86  1.80 - 7.70 K/uL Final    Lymphocytes, Absolute 08/18/2022 1.06  1.00 - 4.80 K/uL Final    Monocytes, Absolute 08/18/2022 0.23  0.00 - 0.80 K/uL Final    Eosinophils, Absolute 08/18/2022 0.34  0.00 - 0.50 K/uL Final    Basophils, Absolute 08/18/2022 0.05  0.00 - 0.20 K/uL Final    Immature Granulocytes, Absolute 08/18/2022 0.01  0.00 - 0.04 K/uL Final    nRBC, Absolute 08/18/2022 0.00  <=0.00 x10e3/uL Final    Diff Type 08/18/2022 Auto   Final    Platelet Morphology 08/18/2022 Decreased (A)  Normal Final    Anisocytosis 08/18/2022 1+   Final   Lab Requisition on 08/12/2022   Component Date Value Ref Range Status    ESR Westergren 08/12/2022 66 (H)  0 - 15 mm/Hr Final    WBC 08/12/2022 4.96  4.50 - 11.00 K/uL Final    RBC 08/12/2022 4.81  4.60 - 6.20 M/uL Final    Hemoglobin 08/12/2022 13.0 (L)  13.5 - 18.0 g/dL Final    Hematocrit 08/12/2022 40.0  40.0 - 54.0 % Final    MCV 08/12/2022 83.2  80.0 - 96.0 fL Final    MCH 08/12/2022 27.0  27.0 - 31.0 pg Final    MCHC 08/12/2022 32.5  32.0 - 36.0 g/dL Final    RDW 08/12/2022 13.3  11.5 - 14.5 % Final    Platelet Count 08/12/2022 292  150 - 400 K/uL Final    MPV 08/12/2022 9.6  9.4 - 12.4 fL Final    Neutrophils % 08/12/2022 43.4 (L)  53.0 - 65.0 % Final    Lymphocytes % 08/12/2022 42.7 (H)  27.0 - 41.0 % Final    Monocytes % 08/12/2022 7.1 (H)  2.0 - 6.0 % Final    Eosinophils % 08/12/2022 5.6 (H)  1.0 - 4.0 % Final    Basophils  % 08/12/2022 1.2 (H)  0.0 - 1.0 % Final    Immature Granulocytes % 08/12/2022 0.0  0.0 - 0.4 % Final    nRBC, Auto 08/12/2022 0.0  <=0.0 % Final    Neutrophils, Abs 08/12/2022 2.15  1.80 - 7.70 K/uL Final    Lymphocytes, Absolute 08/12/2022 2.12  1.00 - 4.80 K/uL Final    Monocytes, Absolute 08/12/2022 0.35  0.00 - 0.80 K/uL Final    Eosinophils, Absolute 08/12/2022 0.28  0.00 - 0.50 K/uL Final    Basophils, Absolute 08/12/2022 0.06  0.00 - 0.20 K/uL Final    Immature Granulocytes, Absolute 08/12/2022 0.00  0.00 - 0.04 K/uL Final    nRBC, Absolute 08/12/2022 0.00  <=0.00 x10e3/uL Final    Diff Type 08/12/2022 Scan Smear   Final    Segmented Neutrophils, Man % 08/12/2022 46 (L)  50 - 62 % Final    Lymphocytes, Man % 08/12/2022 39  27 - 41 % Final    Monocytes, Man % 08/12/2022 9 (H)  2 - 6 % Final    Eosinophils, Man % 08/12/2022 6 (H)  1 - 4 % Final    Platelet Morphology 08/12/2022 Few Large Platelets (A)  Normal Final    RBC Morphology 08/12/2022 Normal   Final    Atypical Lymphocytes 08/12/2022 Rare   Final   Lab Requisition on 08/11/2022   Component Date Value Ref Range Status    Sodium 08/11/2022 137  136 - 145 mmol/L Final    Potassium 08/11/2022 4.4  3.5 - 5.1 mmol/L Final    Chloride 08/11/2022 105  98 - 107 mmol/L Final    CO2 08/11/2022 24  21 - 32 mmol/L Final    Anion Gap 08/11/2022 12  7 - 16 mmol/L Final    Glucose 08/11/2022 91  74 - 106 mg/dL Final    BUN 08/11/2022 26 (H)  7 - 18 mg/dL Final    Creatinine 08/11/2022 0.98  0.70 - 1.30 mg/dL Final    BUN/Creatinine Ratio 08/11/2022 27 (H)  6 - 20 Final    Calcium 08/11/2022 8.9  8.5 - 10.1 mg/dL Final    eGFR 08/11/2022 101  >=60 mL/min/1.73m² Final    CRP 08/11/2022 0.91 (H)  0.00 - 0.80 mg/dL Final   Office Visit on 07/27/2022   Component Date Value Ref Range Status    Hemoglobin A1C 07/27/2022 7.3 (H)  4.5 - 6.6 % Final    Estimated Average Glucose 07/27/2022 157  mg/dL Final    Creatinine, Urine 07/27/2022 115  39 - 259 mg/dL Final    Microalbumin  07/27/2022 16.1 (H)  0.0 - 2.8 mg/dL Final    Microalbumin/Creatinine Ratio 07/27/2022 140.0 (H)  0.0 - 30.0 mg/g Final   Office Visit on 07/27/2022   Component Date Value Ref Range Status    Culture, Anaerobe 07/27/2022 Bacteroides thetaiotaomicron (A)   Final    Culture, Anaerobe 07/27/2022 Finegoldia magna (A)   Final    Culture, Wound/Abscess 07/27/2022 Heavy Growth Enterobacter cloacae (A)   Final   There may be more visits with results that are not included.         No orders of the defined types were placed in this encounter.      Requested Prescriptions     Signed Prescriptions Disp Refills    HYDROcodone-acetaminophen (NORCO)  mg per tablet 60 tablet 0     Sig: Take 1 tablet by mouth every 12 (twelve) hours as needed for Pain.    HYDROcodone-acetaminophen (NORCO)  mg per tablet 60 tablet 0     Sig: Take 1 tablet by mouth every 12 (twelve) hours as needed for Pain.       Assessment:     1. Ulcer of right heel, with necrosis of bone    2. Diabetic neuropathy with neurologic complication    3. Peripheral vascular disease, unspecified         A's of Opioid Risk Assessment  Activity:Patient can perform ADL.   Analgesia:Patients pain is partially controlled by current medication. Patient has tried OTC medications such as Tylenol and Ibuprofen with out relief.   Adverse Effects: Patient denies constipation or sedation.  Aberrant Behavior:  reviewed with no aberrant drug seeking/taking behavior.  Overdose reversal drug naloxone discussed    Drug screen reviewed      Plan:    Uses crutches/lower extremity scooter for ambulation     Lower extremity discomfort right greater than left    Ulcer right foot has worsened continues to follow in management    PICC line left biceps    Complaint increasing pain due to chronic infection lower extremity requesting adjustment pain medication patient has been compliant with office visits drug screens    Will increase [place] 10 1 p.o. q.12 hours number 60  tablets    He would like to continue with conservative management    Continue activity as directed continue current medication    Follow-up 2 months    Dr. Paulson, May 2023    Bring original prescription medication bottles/container/box with labels to each visit    Pill count    Physical therapy    Massage therapy declines      5

## 2023-09-14 NOTE — ED ADULT TRIAGE NOTE - CHIEF COMPLAINT QUOTE
Patient presents to ED with c/o needing sutures removed from left hip area that were placed on 9/4/23.  Offers no other complaints at this time.

## 2023-09-14 NOTE — ED PROVIDER NOTE - CLINICAL SUMMARY MEDICAL DECISION MAKING FREE TEXT BOX
28 yr old M presented to ED for suture removal. Pt denies any redness or pain to his injury site. Pt also denies any fever, chills or issues with the sutures.  Examination + well healing wound and sutures in place. No signs of infection.  Sutures removed successful and Pt D/C in stable condition

## 2023-10-27 ENCOUNTER — EMERGENCY (EMERGENCY)
Facility: HOSPITAL | Age: 28
LOS: 1 days | Discharge: HOME | End: 2023-10-27
Attending: STUDENT IN AN ORGANIZED HEALTH CARE EDUCATION/TRAINING PROGRAM
Payer: COMMERCIAL

## 2023-10-27 VITALS
TEMPERATURE: 98 F | DIASTOLIC BLOOD PRESSURE: 76 MMHG | WEIGHT: 130.07 LBS | HEART RATE: 89 BPM | RESPIRATION RATE: 18 BRPM | OXYGEN SATURATION: 98 % | SYSTOLIC BLOOD PRESSURE: 143 MMHG | HEIGHT: 71 IN

## 2023-10-27 PROCEDURE — 99283 EMERGENCY DEPT VISIT LOW MDM: CPT

## 2023-10-27 PROCEDURE — 99282 EMERGENCY DEPT VISIT SF MDM: CPT

## 2023-10-27 NOTE — ED PROVIDER NOTE - PATIENT PORTAL LINK FT
You can access the FollowMyHealth Patient Portal offered by [facility] by registering at the following website: [email or web address]. By joining [employer]’s FollowMyHealth portal, you will also be able to view your health information using other applications (apps) compatible with our system.

## 2023-10-27 NOTE — ED PROVIDER NOTE - ATTENDING APP SHARED VISIT CONTRIBUTION OF CARE
28y M w/ no significant PMH presents for finger pain. Pt reports recent fish hook injury to L thumb 3 weeks ago, for which he was treated with cipro. Has since developed a blister to the area; says he was picking at it tonight when hit popped and some fluid came out. On exam, pt with open blister to palmar aspect of distal phalanx of L thumb. No surrounding erythema, FROM of thumb/hand. Pt reassured. Medically stable for discharge with return precautions.

## 2023-10-27 NOTE — ED PROVIDER NOTE - PHYSICAL EXAMINATION
General: non-toxic appearing, in no acute distress, A+Ox3  CV: RRR, nl s1/s2.  Resp: CTAB, normal rate and effort  GI: Abdomen soft, NT, ND. Active bowel sounds. No rebound, no guarding  : No CVAT. Vagina and cervix without lesions, DC, or blood. Uterus and adnexa NT. Penis circumsized without lesions, urethal meatus normal location without DC, testes and epididymides normal sizes without massess, scrotum without lesions.   Neuro: A&O x 3, CN II-XII intact, sensorimotor intact without deficits   MSK: No spine or joint tenderness to palpation, Full ROM and strength ext x 4. Normal Gait  Skin: No rashes, lacerations, abrasions, ecchymosis. intact and perfused. General: non-toxic appearing, in no acute distress, A+Ox3  CV: RRR, nl s1/s2.  Resp: CTAB, normal rate and effort  MSK: FROM fingers  Skin: L thumb open blister

## 2023-10-27 NOTE — ED PROVIDER NOTE - CLINICAL SUMMARY MEDICAL DECISION MAKING FREE TEXT BOX
27yo M p/w open blister. pt had fish hook injury 3w ago and conerned for infection. on eval, well appeairng, open blister noted on L thumb without signs of infection. discussed supportive care measures and return precautiuons. advised to f.u with PCP. pt verbalized understanding and agreement. left without DC papers

## 2023-10-27 NOTE — ED PROVIDER NOTE - OBJECTIVE STATEMENT
27yo M presents to ED for eval of finger. pt reports skin at tip of finger was peeling, picked at skin, fluid came out of area where picked. [t believes it may be infected. pt reports having fish hook injury of L thumb 3w ago. was seen by UC and given cipro. pt non-compliant with abx. denies fever, purulent DC< finger pain, LROM of finger, numbness 27yo M presents to ED for eval of finger. pt reports skin at tip of finger was peeling, picked at skin, fluid came out of area where picked. [t believes it may be infected. pt reports having fish hook injury of L thumb 3w ago. was seen by UC and given cipro. pt non-compliant with abx. denies fever, purulent DC, finger pain, LROM of finger, numbness

## 2024-03-19 NOTE — ED PROVIDER NOTE - PROGRESS NOTE DETAILS
Opt out SHYAM Murphy: Labs reviewed, ct negative for intrabd pathology. Pt re-assessed at this time, feeling well, noting improvement in symptoms. VSS, tolerating PO intake. All results reviewed with pt, all questions answered. Pt provided copy of all results. Return precautions given. Stable for dc. no

## 2024-05-19 ENCOUNTER — EMERGENCY (EMERGENCY)
Facility: HOSPITAL | Age: 29
LOS: 1 days | Discharge: HOME | End: 2024-05-19
Attending: STUDENT IN AN ORGANIZED HEALTH CARE EDUCATION/TRAINING PROGRAM
Payer: COMMERCIAL

## 2024-05-19 VITALS
SYSTOLIC BLOOD PRESSURE: 126 MMHG | OXYGEN SATURATION: 98 % | DIASTOLIC BLOOD PRESSURE: 83 MMHG | HEIGHT: 70 IN | RESPIRATION RATE: 20 BRPM | WEIGHT: 172.62 LBS | HEART RATE: 77 BPM | TEMPERATURE: 98 F

## 2024-05-19 PROCEDURE — 70450 CT HEAD/BRAIN W/O DYE: CPT | Mod: MC

## 2024-05-19 PROCEDURE — 70450 CT HEAD/BRAIN W/O DYE: CPT | Mod: 26,MC

## 2024-05-19 PROCEDURE — 73080 X-RAY EXAM OF ELBOW: CPT

## 2024-05-19 PROCEDURE — 99284 EMERGENCY DEPT VISIT MOD MDM: CPT | Mod: 25

## 2024-05-19 PROCEDURE — 99284 EMERGENCY DEPT VISIT MOD MDM: CPT

## 2024-05-19 PROCEDURE — 73080 X-RAY EXAM OF ELBOW: CPT | Mod: 26,LT

## 2024-05-19 NOTE — ED PROVIDER NOTE - CARDIOVASCULAR NEGATIVE STATEMENT, MLM
CLARIFIED PATIENT QUESTIONS REGARDING CPAP   PT MAY WANT TO CONSIDER OTHER TREATMENT OPTIONS LATER    no chest pain and no edema.

## 2024-05-19 NOTE — ED PROVIDER NOTE - PATIENT PORTAL LINK FT
You can access the FollowMyHealth Patient Portal offered by [facility] by registering at the following website: [email or web address]. By joining [employer]’s FollowMyHealth portal, you will also be able to view your health information using other applications (apps) compatible with our system.
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
## 2024-05-19 NOTE — ED PROVIDER NOTE - IV ALTEPLASE EXCL REL HIDDEN
Adequately replaced  normal appearance , without tenderness upon palpation , no deformities , trachea midline , Thyroid normal size , no thyroid nodules , no masses , no JVD , thyroid nontender show
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
## 2025-01-04 ENCOUNTER — EMERGENCY (EMERGENCY)
Facility: HOSPITAL | Age: 30
LOS: 1 days | Discharge: HOME | End: 2025-01-04
Attending: STUDENT IN AN ORGANIZED HEALTH CARE EDUCATION/TRAINING PROGRAM
Payer: COMMERCIAL

## 2025-01-04 VITALS
TEMPERATURE: 98 F | HEART RATE: 89 BPM | SYSTOLIC BLOOD PRESSURE: 135 MMHG | OXYGEN SATURATION: 97 % | WEIGHT: 173.28 LBS | RESPIRATION RATE: 18 BRPM | DIASTOLIC BLOOD PRESSURE: 90 MMHG

## 2025-01-04 VITALS
SYSTOLIC BLOOD PRESSURE: 144 MMHG | DIASTOLIC BLOOD PRESSURE: 71 MMHG | RESPIRATION RATE: 18 BRPM | HEART RATE: 94 BPM | TEMPERATURE: 98 F | OXYGEN SATURATION: 98 %

## 2025-01-04 LAB
ALBUMIN SERPL ELPH-MCNC: 4.5 G/DL — SIGNIFICANT CHANGE UP (ref 3.3–5.2)
ALP SERPL-CCNC: 37 U/L — LOW (ref 40–120)
ALT FLD-CCNC: 32 U/L — SIGNIFICANT CHANGE UP
ANION GAP SERPL CALC-SCNC: 15 MMOL/L — SIGNIFICANT CHANGE UP (ref 5–17)
AST SERPL-CCNC: 22 U/L — SIGNIFICANT CHANGE UP
BASOPHILS # BLD AUTO: 0.03 K/UL — SIGNIFICANT CHANGE UP (ref 0–0.2)
BASOPHILS NFR BLD AUTO: 0.3 % — SIGNIFICANT CHANGE UP (ref 0–2)
BILIRUB SERPL-MCNC: 0.6 MG/DL — SIGNIFICANT CHANGE UP (ref 0.4–2)
BUN SERPL-MCNC: 15.7 MG/DL — SIGNIFICANT CHANGE UP (ref 8–20)
CALCIUM SERPL-MCNC: 8.9 MG/DL — SIGNIFICANT CHANGE UP (ref 8.4–10.5)
CHLORIDE SERPL-SCNC: 105 MMOL/L — SIGNIFICANT CHANGE UP (ref 96–108)
CO2 SERPL-SCNC: 19 MMOL/L — LOW (ref 22–29)
CREAT SERPL-MCNC: 0.61 MG/DL — SIGNIFICANT CHANGE UP (ref 0.5–1.3)
EGFR: 133 ML/MIN/1.73M2 — SIGNIFICANT CHANGE UP
EOSINOPHIL # BLD AUTO: 0.19 K/UL — SIGNIFICANT CHANGE UP (ref 0–0.5)
EOSINOPHIL NFR BLD AUTO: 2.2 % — SIGNIFICANT CHANGE UP (ref 0–6)
GLUCOSE SERPL-MCNC: 112 MG/DL — HIGH (ref 70–99)
HCT VFR BLD CALC: 46.3 % — SIGNIFICANT CHANGE UP (ref 39–50)
HGB BLD-MCNC: 16.3 G/DL — SIGNIFICANT CHANGE UP (ref 13–17)
IMM GRANULOCYTES NFR BLD AUTO: 0.2 % — SIGNIFICANT CHANGE UP (ref 0–0.9)
LIDOCAIN IGE QN: 14 U/L — LOW (ref 22–51)
LYMPHOCYTES # BLD AUTO: 0.53 K/UL — LOW (ref 1–3.3)
LYMPHOCYTES # BLD AUTO: 6.1 % — LOW (ref 13–44)
MCHC RBC-ENTMCNC: 30.2 PG — SIGNIFICANT CHANGE UP (ref 27–34)
MCHC RBC-ENTMCNC: 35.2 G/DL — SIGNIFICANT CHANGE UP (ref 32–36)
MCV RBC AUTO: 85.9 FL — SIGNIFICANT CHANGE UP (ref 80–100)
MONOCYTES # BLD AUTO: 0.28 K/UL — SIGNIFICANT CHANGE UP (ref 0–0.9)
MONOCYTES NFR BLD AUTO: 3.2 % — SIGNIFICANT CHANGE UP (ref 2–14)
NEUTROPHILS # BLD AUTO: 7.67 K/UL — HIGH (ref 1.8–7.4)
NEUTROPHILS NFR BLD AUTO: 88 % — HIGH (ref 43–77)
PLATELET # BLD AUTO: 193 K/UL — SIGNIFICANT CHANGE UP (ref 150–400)
POTASSIUM SERPL-MCNC: 4.7 MMOL/L — SIGNIFICANT CHANGE UP (ref 3.5–5.3)
POTASSIUM SERPL-SCNC: 4.7 MMOL/L — SIGNIFICANT CHANGE UP (ref 3.5–5.3)
PROT SERPL-MCNC: 7.2 G/DL — SIGNIFICANT CHANGE UP (ref 6.6–8.7)
RBC # BLD: 5.39 M/UL — SIGNIFICANT CHANGE UP (ref 4.2–5.8)
RBC # FLD: 11.4 % — SIGNIFICANT CHANGE UP (ref 10.3–14.5)
SODIUM SERPL-SCNC: 139 MMOL/L — SIGNIFICANT CHANGE UP (ref 135–145)
WBC # BLD: 8.72 K/UL — SIGNIFICANT CHANGE UP (ref 3.8–10.5)
WBC # FLD AUTO: 8.72 K/UL — SIGNIFICANT CHANGE UP (ref 3.8–10.5)

## 2025-01-04 PROCEDURE — 80053 COMPREHEN METABOLIC PANEL: CPT

## 2025-01-04 PROCEDURE — 36415 COLL VENOUS BLD VENIPUNCTURE: CPT

## 2025-01-04 PROCEDURE — 85025 COMPLETE CBC W/AUTO DIFF WBC: CPT

## 2025-01-04 PROCEDURE — 83690 ASSAY OF LIPASE: CPT

## 2025-01-04 PROCEDURE — 99284 EMERGENCY DEPT VISIT MOD MDM: CPT

## 2025-01-04 PROCEDURE — 99284 EMERGENCY DEPT VISIT MOD MDM: CPT | Mod: 25

## 2025-01-04 PROCEDURE — 96375 TX/PRO/DX INJ NEW DRUG ADDON: CPT

## 2025-01-04 PROCEDURE — 96374 THER/PROPH/DIAG INJ IV PUSH: CPT

## 2025-01-04 RX ORDER — ONDANSETRON 4 MG/1
1 TABLET ORAL
Qty: 9 | Refills: 0
Start: 2025-01-04 | End: 2025-01-06

## 2025-01-04 RX ORDER — SODIUM CHLORIDE 9 MG/ML
1000 INJECTION, SOLUTION INTRAMUSCULAR; INTRAVENOUS; SUBCUTANEOUS ONCE
Refills: 0 | Status: COMPLETED | OUTPATIENT
Start: 2025-01-04 | End: 2025-01-04

## 2025-01-04 RX ORDER — ONDANSETRON 4 MG/1
4 TABLET ORAL ONCE
Refills: 0 | Status: COMPLETED | OUTPATIENT
Start: 2025-01-04 | End: 2025-01-04

## 2025-01-04 RX ORDER — FAMOTIDINE 20 MG/1
20 TABLET, FILM COATED ORAL ONCE
Refills: 0 | Status: COMPLETED | OUTPATIENT
Start: 2025-01-04 | End: 2025-01-04

## 2025-01-04 RX ORDER — KETOROLAC TROMETHAMINE 30 MG/ML
30 INJECTION INTRAMUSCULAR; INTRAVENOUS ONCE
Refills: 0 | Status: DISCONTINUED | OUTPATIENT
Start: 2025-01-04 | End: 2025-01-04

## 2025-01-04 RX ADMIN — ONDANSETRON 4 MILLIGRAM(S): 4 TABLET ORAL at 06:00

## 2025-01-04 RX ADMIN — FAMOTIDINE 20 MILLIGRAM(S): 20 TABLET, FILM COATED ORAL at 06:00

## 2025-01-04 RX ADMIN — SODIUM CHLORIDE 1000 MILLILITER(S): 9 INJECTION, SOLUTION INTRAMUSCULAR; INTRAVENOUS; SUBCUTANEOUS at 06:00

## 2025-01-04 RX ADMIN — KETOROLAC TROMETHAMINE 30 MILLIGRAM(S): 30 INJECTION INTRAMUSCULAR; INTRAVENOUS at 06:00

## 2025-01-04 NOTE — ED PROVIDER NOTE - ATTENDING APP SHARED VISIT CONTRIBUTION OF CARE
Start OCP.   29y M w/ no significant PMH presents for abdominal pain, nausea and vomiting. Stable VS, no emergent findings on labs. Pt feeling better and tolerating PO in the ED. Stable for discharge with return precautions.

## 2025-01-04 NOTE — ED PROVIDER NOTE - PATIENT PORTAL LINK FT
You can access the FollowMyHealth Patient Portal offered by [facility] by registering at the following website: [email or web address]. By joining [email or web address]’s FollowMyHealth portal, you will also be able to view your health information using other applications (apps) compatible with our system.

## 2025-01-04 NOTE — ED PROVIDER NOTE - CLINICAL SUMMARY MEDICAL DECISION MAKING FREE TEXT BOX
29 year old male no PMhx presents to ED for n/v abdominal pain. Pt reports onset pain this morning with associated vomiting. Pt reports NBNB emesis. Pt denies fevers, chills, HA, lightheadedness, SOB, CP, diarrhea, dysuria, urgency, frequency.   Labs, meds, re-assess 29 year old male no PMhx presents to ED for n/v abdominal pain. Pt reports onset pain this morning with associated vomiting. Pt reports NBNB emesis. Pt denies fevers, chills, HA, lightheadedness, SOB, CP, diarrhea, dysuria, urgency, frequency.   Labs, meds, re-assess  labs grossly unremarkable  pt tolerating PO    Pt reassessed, pt feeling better at this time, vss, pt able to walk, talk and vocalized plan of action. Discussed in depth and explained to pt in depth the next steps that need to be taken including proper follow up with PCP or specialists. All incidental findings were discussed with pt as well. Pt verbalized their concerns and all questions were answered. Pt understands dispo and wants discharge. Given good instructions when to return to ED, strict return precautions and importance of f/u.

## 2025-01-04 NOTE — ED ADULT TRIAGE NOTE - CHIEF COMPLAINT QUOTE
Pt stated that he has been experiencing abdominal pain +n/v starting earlier today.  Denies diarrhea

## 2025-01-04 NOTE — ED PROVIDER NOTE - OBJECTIVE STATEMENT
29 year old male no PMhx presents to ED for n/v abdominal pain. Pt reports onset pain this morning with associated vomiting. Pt reports NBNB emesis. Pt denies fevers, chills, HA, lightheadedness, SOB, CP, diarrhea, dysuria, urgency, frequency.